# Patient Record
Sex: MALE | Race: WHITE
[De-identification: names, ages, dates, MRNs, and addresses within clinical notes are randomized per-mention and may not be internally consistent; named-entity substitution may affect disease eponyms.]

---

## 2023-10-16 ENCOUNTER — HOSPITAL ENCOUNTER (EMERGENCY)
Dept: HOSPITAL 76 - ED | Age: 28
Discharge: HOME | End: 2023-10-16
Payer: COMMERCIAL

## 2023-10-16 VITALS — SYSTOLIC BLOOD PRESSURE: 133 MMHG | OXYGEN SATURATION: 100 % | DIASTOLIC BLOOD PRESSURE: 67 MMHG

## 2023-10-16 DIAGNOSIS — K59.00: Primary | ICD-10-CM

## 2023-10-16 DIAGNOSIS — R10.31: ICD-10-CM

## 2023-10-16 LAB
ALBUMIN DIAFP-MCNC: 5.2 G/DL (ref 3.2–5.5)
ALBUMIN/GLOB SERPL: 2.1 {RATIO} (ref 1–2.2)
ALP SERPL-CCNC: 52 IU/L (ref 42–121)
ALT SERPL W P-5'-P-CCNC: 27 IU/L (ref 10–60)
ANION GAP SERPL CALCULATED.4IONS-SCNC: 6 MMOL/L (ref 6–13)
AST SERPL W P-5'-P-CCNC: 16 IU/L (ref 10–42)
BASOPHILS NFR BLD AUTO: 0 10^3/UL (ref 0–0.1)
BASOPHILS NFR BLD AUTO: 0.6 %
BILIRUB BLD-MCNC: 0.6 MG/DL (ref 0.2–1)
BUN SERPL-MCNC: 11 MG/DL (ref 6–20)
CALCIUM UR-MCNC: 10.2 MG/DL (ref 8.5–10.3)
CHLORIDE SERPL-SCNC: 101 MMOL/L (ref 101–111)
CLARITY UR REFRACT.AUTO: CLEAR
CO2 SERPL-SCNC: 30 MMOL/L (ref 21–32)
CREAT SERPLBLD-SCNC: 0.8 MG/DL (ref 0.6–1.3)
EOSINOPHIL # BLD AUTO: 0.2 10^3/UL (ref 0–0.7)
EOSINOPHIL NFR BLD AUTO: 3 %
ERYTHROCYTE [DISTWIDTH] IN BLOOD BY AUTOMATED COUNT: 12.5 % (ref 12–15)
GFRSERPLBLD MDRD-ARVRAT: 115 ML/MIN/{1.73_M2} (ref 89–?)
GLOBULIN SER-MCNC: 2.5 G/DL (ref 2.1–4.2)
GLUCOSE SERPL-MCNC: 102 MG/DL (ref 74–104)
GLUCOSE UR QL STRIP.AUTO: NEGATIVE MG/DL
HCT VFR BLD AUTO: 47.2 % (ref 42–52)
HGB UR QL STRIP: 16.3 G/DL (ref 14–18)
KETONES UR QL STRIP.AUTO: NEGATIVE MG/DL
LIPASE SERPL-CCNC: 20 U/L (ref 11–82)
LYMPHOCYTES # SPEC AUTO: 2 10^3/UL (ref 1.5–3.5)
LYMPHOCYTES NFR BLD AUTO: 27.9 %
MCH RBC QN AUTO: 30 PG (ref 27–31)
MCHC RBC AUTO-ENTMCNC: 34.5 G/DL (ref 32–36)
MCV RBC AUTO: 86.9 FL (ref 80–94)
MONOCYTES # BLD AUTO: 0.4 10^3/UL (ref 0–1)
MONOCYTES NFR BLD AUTO: 5.7 %
NEUTROPHILS # BLD AUTO: 4.4 10^3/UL (ref 1.5–6.6)
NEUTROPHILS # SNV AUTO: 7 X10^3/UL (ref 4.8–10.8)
NEUTROPHILS NFR BLD AUTO: 62.2 %
NITRITE UR QL STRIP.AUTO: NEGATIVE
NRBC # BLD AUTO: 0 /100WBC
NRBC # BLD AUTO: 0 X10^3/UL
PDW BLD AUTO: 10.3 FL (ref 7.4–11.4)
PH UR STRIP.AUTO: 7 PH (ref 5–7.5)
PLATELET # BLD: 271 10^3/UL (ref 130–450)
POTASSIUM SERPL-SCNC: 3.9 MMOL/L (ref 3.5–4.5)
PROT SPEC-MCNC: 7.7 G/DL (ref 6.4–8.9)
PROT UR STRIP.AUTO-MCNC: NEGATIVE MG/DL
RBC # UR STRIP.AUTO: NEGATIVE /UL
RBC MAR: 5.43 10^6/UL (ref 4.7–6.1)
SODIUM SERPLBLD-SCNC: 137 MMOL/L (ref 135–145)
SP GR UR STRIP.AUTO: <=1.005 (ref 1–1.03)
UROBILINOGEN UR QL STRIP.AUTO: (no result) E.U./DL
UROBILINOGEN UR STRIP.AUTO-MCNC: NEGATIVE MG/DL

## 2023-10-16 PROCEDURE — 36415 COLL VENOUS BLD VENIPUNCTURE: CPT

## 2023-10-16 PROCEDURE — 81003 URINALYSIS AUTO W/O SCOPE: CPT

## 2023-10-16 PROCEDURE — 74177 CT ABD & PELVIS W/CONTRAST: CPT

## 2023-10-16 PROCEDURE — 81001 URINALYSIS AUTO W/SCOPE: CPT

## 2023-10-16 PROCEDURE — 87086 URINE CULTURE/COLONY COUNT: CPT

## 2023-10-16 PROCEDURE — 99284 EMERGENCY DEPT VISIT MOD MDM: CPT

## 2023-10-16 PROCEDURE — 99283 EMERGENCY DEPT VISIT LOW MDM: CPT

## 2023-10-16 PROCEDURE — 80053 COMPREHEN METABOLIC PANEL: CPT

## 2023-10-16 PROCEDURE — 85025 COMPLETE CBC W/AUTO DIFF WBC: CPT

## 2023-10-16 PROCEDURE — 83690 ASSAY OF LIPASE: CPT

## 2023-10-16 NOTE — ED PHYSICIAN DOCUMENTATION
History of Present Illness





- Stated complaint


Stated Complaint: LOWER ABD PX





- Chief complaint


Chief Complaint: Abd Pain





- Additonal information


Additional information: 





20-year-old male presents emergency department for evaluation of acute right 

lower pelvic pain that he first noticed yesterday when he was at work.  He was 

lifting some boxes.  He did not think much of it initially but his course the 

day went on the pain got worse and radiated to the left side of his abdomen.  No

fevers, nausea or vomiting.  No melena.  No dysuria or hematuria.  No history of

similar.





Review of Systems


Constitutional: denies: Fever


Cardiac: reports: Reviewed and negative


Respiratory: reports: Reviewed and negative


GI: reports: Abdominal Pain


: reports: Reviewed and negative





PD PAST MEDICAL HISTORY





- Past Medical History


Past Medical History: No


Cardiovascular: None


Respiratory: None


Neuro: None


Endocrine/Autoimmune: None


GI: None


: None


HEENT: None


Psych: None


Musculoskeletal: None


Derm: None





- Present Medications


Home Medications: 


                                Ambulatory Orders











 Medication  Instructions  Recorded  Confirmed


 


No Known Home Medications  10/16/23 10/16/23














- Allergies


Allergies/Adverse Reactions: 


                                    Allergies











Allergy/AdvReac Type Severity Reaction Status Date / Time


 


No Known Drug Allergies Allergy   Verified 10/16/23 11:47














- Social History


Does the pt smoke?: No


Smoking Status: Never smoker


Does the pt drink ETOH?: Yes


Does the pt have substance abuse?: No





- Immunizations


Immunizations are current?: Yes





PD ED PE NORMAL





- General


General: Alert and oriented X 3, No acute distress, Well developed/nourished





- HEENT


HEENT: Atraumatic





- Neck


Neck: Supple, no meningeal sign, No JVD





- Cardiac


Cardiac: RRR, No murmur





- Respiratory


Respiratory: No respiratory distress, Clear bilaterally





- Abdomen


Abdomen: Normal bowel sounds, Soft.  No: Non tender (Very mild right lower 

quadrant abdominal tenderness without guarding or rebound.  The pain is very low

in the pelvic region just above the inguinal fold.  However I was unable to 

appreciate a hernia when he was either laying flat or upright.)





Results





- Vitals


Vitals: 


                               Vital Signs - 24 hr











  10/16/23 10/16/23





  11:48 13:34


 


Temperature 36.8 C 


 


Heart Rate 115 H 77


 


Respiratory 18 16





Rate  


 


Blood Pressure 167/82 H 131/91 H


 


O2 Saturation 100 100








                                     Oxygen











O2 Source                      Room air

















- Labs


Labs: 


                                Laboratory Tests











  10/16/23 10/16/23 10/16/23





  12:06 12:11 12:11


 


WBC   7.0 


 


RBC   5.43 


 


Hgb   16.3 


 


Hct   47.2 


 


MCV   86.9 


 


MCH   30.0 


 


MCHC   34.5 


 


RDW   12.5 


 


Plt Count   271 


 


MPV   10.3 


 


Neut # (Auto)   4.4 


 


Lymph # (Auto)   2.0 


 


Mono # (Auto)   0.4 


 


Eos # (Auto)   0.2 


 


Baso # (Auto)   0.0 


 


Absolute Nucleated RBC   0.00 


 


Nucleated RBC %   0.0 


 


Sodium    137


 


Potassium    3.9


 


Chloride    101


 


Carbon Dioxide    30


 


Anion Gap    6.0


 


BUN    11


 


Creatinine    0.8


 


Estimated GFR (MDRD)    115


 


Glucose    102


 


Calcium    10.2


 


Total Bilirubin    0.6


 


AST    16


 


ALT    27


 


Alkaline Phosphatase    52


 


Total Protein    7.7


 


Albumin    5.2


 


Globulin    2.5


 


Albumin/Globulin Ratio    2.1


 


Lipase    20


 


Urine Color  YELLOW  


 


Urine Clarity  CLEAR  


 


Urine pH  7.0  


 


Ur Specific Gravity  <=1.005  


 


Urine Protein  NEGATIVE  


 


Urine Glucose (UA)  NEGATIVE  


 


Urine Ketones  NEGATIVE  


 


Urine Occult Blood  NEGATIVE  


 


Urine Nitrite  NEGATIVE  


 


Urine Bilirubin  NEGATIVE  


 


Urine Urobilinogen  0.2 (NORMAL)  


 


Ur Leukocyte Esterase  NEGATIVE  


 


Ur Microscopic Review  NOT INDICATED  


 


Urine Culture Comments  NOT INDICATED  














- Rads (name of study)


  ** CT abd w


Relevant Findings:: Final report received (The colon is redundant with 

moderately large fecal load.  Normal appendix.  No acute abdominal process 

noted.  Mild diffuse hepatic steatosis.)





PD Medical Decision Making





- ED course


Complexity details: reviewed results, d/w patient


ED course: 





2 days of right lower quadrant abdominal pain without fevers nausea vomiting 

diarrhea melena or hematochezia.  On exam he had some mild right lower quadrant 

pelvic pain though no guarding rebound or symptomatology suggestive of acute 

appendicitis.  CBC electrolytes were obtained which showed no acute 

abnormalities.  No infection in the urine or hematuria.  Subsequently CT of the 

abdomen was completed that showed a normal appendix.  The CT was also done to 

evaluate for the possibility of an occult inguinal hernia which was also not 

visualized.  We do note moderate fecal loading within the colon.  This finding 

was discussed with the patient.  Advised MiraLAX.  If symptoms not improved with

constipation unloading then consider regional ultrasound to evaluate for an 

inguinal hernia





Departure





- Departure


Disposition: 01 Home, Self Care


Clinical Impression: 


 Right lower quadrant abdominal pain





Constipation


Qualifiers:


 Constipation type: other constipation type Qualified Code(s): K59.09 - Other 

constipation





Condition: Stable


Comments: 


The labs you had obtained today were essentially normal.  The CT of your abdomen

did not show an inguinal hernia or acute appendicitis.  It does show a large 

amount of stool within the colon which can cause some similar vague pains.  

Recommend that you buy MiraLAX over-the-counter and take it twice daily until 

you have 3 or 4 large watery bowel movements.  If you find the pain in your 

lower abdomen and pelvic region is not better after making sure constipation is 

not an issue follow-up with [facility].  You may then benefit from a 

ultrasound to make sure there is no inguinal hernia that was missed on CT 

imaging.





Return to the ER for any new or worsening symptoms.


Forms:  PCP List

## 2023-10-16 NOTE — CT REPORT
PROCEDURE:  ABDOMEN/PELVIS W

 

INDICATIONS:  RLQ pain; r/o appy

 

CONTRAST: 100ml omni 30 

                     

TECHNIQUE: 

After the administration of intravenous contrast, 5 mm thick sections acquired from the diaphragms to
 the symphysis.  5 mm thick coronal and sagittal reformats were acquired.  For radiation dose reducti
on, the following was used:  automated exposure control, adjustment of mA and/or kV according to alexia
ent size.  

 

COMPARISON:  None

 

FINDINGS:  

Image quality: Excellent.

 

Lung bases and heart: Unremarkable. 

 

Liver: Mild diffuse hepatic steatosis.

Gallbladder and biliary tree: No radiopaque stones or wall thickening. No biliary dilation.

Spleen: No splenomegaly.

Pancreas: No pancreatic ductal dilation.

Adrenals: No adrenal nodule.

Kidneys and ureters: No hydronephrosis. No renal cystic lesion which requires follow up. No solid mas
s. 

 

Bowel and peritoneum: No bowel distension. No pathologic free fluid. There is a normal gas containing
 appendix. Redundant colon with moderately large fecal load. 

Lymph nodes: No central or retroperitoneal adenopathy.

Vessels: No infrarenal aortic aneurysm.

 

PELVIS

Reproductive organs: Unremarkable.

Bladder: No abnormal wall thickening, accounting for underdistension.

Pelvic lymph nodes: No pelvic adenopathy by size criteria.

 

Bones: No aggressive osseous abnormality.

Other: No significant ventral or inguinal hernia.

 

 

IMPRESSION:  

 

1. The colon is redundant with a moderately large fecal load.

 

2. Normal appendix.

 

3. No acute abdominal process noted.

 

4. Mild diffuse hepatic steatosis.

 

 

 

Reviewed by: Corona Cheema MD on 10/16/2023 3:22 PM PDT

Approved by: Corona Cheema MD on 10/16/2023 3:22 PM PDT

 

 

Station ID:  [id number] Patient called and stated she is in severe pain all over body because of boils.  Patient stated she is unable to sleep because of the pain.  Patient is asking if  can see her.  Please advise on scheduling